# Patient Record
Sex: FEMALE | Race: WHITE | NOT HISPANIC OR LATINO | Employment: OTHER | URBAN - METROPOLITAN AREA
[De-identification: names, ages, dates, MRNs, and addresses within clinical notes are randomized per-mention and may not be internally consistent; named-entity substitution may affect disease eponyms.]

---

## 2017-02-03 ENCOUNTER — HOSPITAL ENCOUNTER (OUTPATIENT)
Dept: MAMMOGRAPHY | Facility: CLINIC | Age: 37
Discharge: HOME | End: 2017-02-03
Attending: OBSTETRICS & GYNECOLOGY | Admitting: OBSTETRICS & GYNECOLOGY
Payer: COMMERCIAL

## 2017-02-03 ENCOUNTER — HOSPITAL ENCOUNTER (OUTPATIENT)
Dept: ULTRASOUND IMAGING | Facility: CLINIC | Age: 37
End: 2017-02-03
Attending: OBSTETRICS & GYNECOLOGY
Payer: COMMERCIAL

## 2017-02-03 DIAGNOSIS — N63.10 LUMP OF BREAST, RIGHT: ICD-10-CM

## 2017-02-03 PROCEDURE — 77061 BREAST TOMOSYNTHESIS UNI: CPT

## 2017-02-03 PROCEDURE — 76642 ULTRASOUND BREAST LIMITED: CPT | Mod: RT

## 2018-02-02 ENCOUNTER — HEALTH MAINTENANCE LETTER (OUTPATIENT)
Age: 38
End: 2018-02-02

## 2019-05-10 ENCOUNTER — HOSPITAL ENCOUNTER (OUTPATIENT)
Dept: MAMMOGRAPHY | Facility: CLINIC | Age: 39
Discharge: HOME | End: 2019-05-10
Attending: OBSTETRICS & GYNECOLOGY | Admitting: OBSTETRICS & GYNECOLOGY
Payer: COMMERCIAL

## 2019-05-10 DIAGNOSIS — Z12.31 VISIT FOR SCREENING MAMMOGRAM: ICD-10-CM

## 2019-05-10 PROCEDURE — 77063 BREAST TOMOSYNTHESIS BI: CPT

## 2019-07-08 RX ORDER — ACETAMINOPHEN 500 MG
500-1000 TABLET ORAL EVERY 6 HOURS PRN
Status: ON HOLD | COMMUNITY
End: 2019-07-18

## 2019-07-08 RX ORDER — MULTIVIT-MIN/IRON/FOLIC ACID/K 18-600-40
2000 CAPSULE ORAL DAILY
COMMUNITY

## 2019-07-08 RX ORDER — IBUPROFEN 600 MG/1
600 TABLET, FILM COATED ORAL EVERY 6 HOURS PRN
Status: ON HOLD | COMMUNITY
End: 2019-07-19 | Stop reason: DRUGHIGH

## 2019-07-08 ASSESSMENT — MIFFLIN-ST. JEOR: SCORE: 1517.96

## 2019-07-15 NOTE — PHARMACY-ADMISSION MEDICATION HISTORY
Pharmacy reviewed prior to admission med list from pre-admitting rn, GAGAN Marie.        Prior to Admission medications    Medication Sig Last Dose Taking? Auth Provider   acetaminophen (TYLENOL) 500 MG tablet Take 500-1,000 mg by mouth every 6 hours as needed for mild pain 7/11/2019 at prn Yes Reported, Patient   Ascorbic Acid (VITAMIN C GUMMIE PO) Take 1 tablet by mouth daily  7/11/2019 Yes Reported, Patient   ELDERBERRY PO Take 1 tablet by mouth daily 7/11/2019 Yes Unknown, Entered By History   ibuprofen (ADVIL/MOTRIN) 600 MG tablet Take 600 mg by mouth every 6 hours as needed for moderate pain Past Month at Unknown time Yes Reported, Patient   norgestim-eth estrad triphasic (TRINESSA, 28,) 0.18/0.215/0.25 MG-35 MCG TABS tablet Take 1 tablet by mouth daily Will take until her surgery. Last dose planned is for 7/15 in the evening. 7/13/2019 at pm Yes Reported, Patient   Vitamin D, Cholecalciferol, 1000 units TABS Take 2,000 Units by mouth daily 7/11/2019 Yes Reported, Patient

## 2019-07-16 ENCOUNTER — ANESTHESIA EVENT (OUTPATIENT)
Dept: SURGERY | Facility: CLINIC | Age: 39
End: 2019-07-16
Payer: COMMERCIAL

## 2019-07-16 ENCOUNTER — ANESTHESIA (OUTPATIENT)
Dept: SURGERY | Facility: CLINIC | Age: 39
End: 2019-07-16
Payer: COMMERCIAL

## 2019-07-16 ENCOUNTER — HOSPITAL ENCOUNTER (INPATIENT)
Facility: CLINIC | Age: 39
LOS: 3 days | Discharge: HOME | End: 2019-07-19
Attending: OBSTETRICS & GYNECOLOGY | Admitting: OBSTETRICS & GYNECOLOGY
Payer: COMMERCIAL

## 2019-07-16 DIAGNOSIS — Z90.710 S/P TAH (TOTAL ABDOMINAL HYSTERECTOMY): Primary | ICD-10-CM

## 2019-07-16 LAB
ABO + RH BLD: NORMAL
ABO + RH BLD: NORMAL
BLD GP AB SCN SERPL QL: NORMAL
BLOOD BANK CMNT PATIENT-IMP: NORMAL
HCG UR QL: NEGATIVE
SPECIMEN EXP DATE BLD: NORMAL

## 2019-07-16 PROCEDURE — 27210794 ZZH OR GENERAL SUPPLY STERILE: Performed by: OBSTETRICS & GYNECOLOGY

## 2019-07-16 PROCEDURE — 25000128 H RX IP 250 OP 636: Performed by: ANESTHESIOLOGY

## 2019-07-16 PROCEDURE — 25000128 H RX IP 250 OP 636: Performed by: OBSTETRICS & GYNECOLOGY

## 2019-07-16 PROCEDURE — 25000125 ZZHC RX 250: Performed by: ANESTHESIOLOGY

## 2019-07-16 PROCEDURE — 12000000 ZZH R&B MED SURG/OB

## 2019-07-16 PROCEDURE — 86901 BLOOD TYPING SEROLOGIC RH(D): CPT | Performed by: OBSTETRICS & GYNECOLOGY

## 2019-07-16 PROCEDURE — 88307 TISSUE EXAM BY PATHOLOGIST: CPT | Performed by: OBSTETRICS & GYNECOLOGY

## 2019-07-16 PROCEDURE — 37000008 ZZH ANESTHESIA TECHNICAL FEE, 1ST 30 MIN: Performed by: OBSTETRICS & GYNECOLOGY

## 2019-07-16 PROCEDURE — 25000125 ZZHC RX 250: Performed by: OBSTETRICS & GYNECOLOGY

## 2019-07-16 PROCEDURE — 88342 IMHCHEM/IMCYTCHM 1ST ANTB: CPT | Performed by: OBSTETRICS & GYNECOLOGY

## 2019-07-16 PROCEDURE — 0UT90ZZ RESECTION OF UTERUS, OPEN APPROACH: ICD-10-PCS | Performed by: OBSTETRICS & GYNECOLOGY

## 2019-07-16 PROCEDURE — 71000013 ZZH RECOVERY PHASE 1 LEVEL 1 EA ADDTL HR: Performed by: OBSTETRICS & GYNECOLOGY

## 2019-07-16 PROCEDURE — 36415 COLL VENOUS BLD VENIPUNCTURE: CPT | Performed by: OBSTETRICS & GYNECOLOGY

## 2019-07-16 PROCEDURE — 25000132 ZZH RX MED GY IP 250 OP 250 PS 637: Performed by: OBSTETRICS & GYNECOLOGY

## 2019-07-16 PROCEDURE — 25800030 ZZH RX IP 258 OP 636: Performed by: OBSTETRICS & GYNECOLOGY

## 2019-07-16 PROCEDURE — 25000125 ZZHC RX 250: Performed by: NURSE ANESTHETIST, CERTIFIED REGISTERED

## 2019-07-16 PROCEDURE — 25800025 ZZH RX 258: Performed by: OBSTETRICS & GYNECOLOGY

## 2019-07-16 PROCEDURE — 86850 RBC ANTIBODY SCREEN: CPT | Performed by: OBSTETRICS & GYNECOLOGY

## 2019-07-16 PROCEDURE — 0UT70ZZ RESECTION OF BILATERAL FALLOPIAN TUBES, OPEN APPROACH: ICD-10-PCS | Performed by: OBSTETRICS & GYNECOLOGY

## 2019-07-16 PROCEDURE — 36000056 ZZH SURGERY LEVEL 3 1ST 30 MIN: Performed by: OBSTETRICS & GYNECOLOGY

## 2019-07-16 PROCEDURE — 86900 BLOOD TYPING SEROLOGIC ABO: CPT | Performed by: OBSTETRICS & GYNECOLOGY

## 2019-07-16 PROCEDURE — 81025 URINE PREGNANCY TEST: CPT | Performed by: ANESTHESIOLOGY

## 2019-07-16 PROCEDURE — 25000128 H RX IP 250 OP 636: Performed by: NURSE ANESTHETIST, CERTIFIED REGISTERED

## 2019-07-16 PROCEDURE — 88342 IMHCHEM/IMCYTCHM 1ST ANTB: CPT | Mod: 26 | Performed by: OBSTETRICS & GYNECOLOGY

## 2019-07-16 PROCEDURE — 37000009 ZZH ANESTHESIA TECHNICAL FEE, EACH ADDTL 15 MIN: Performed by: OBSTETRICS & GYNECOLOGY

## 2019-07-16 PROCEDURE — 88307 TISSUE EXAM BY PATHOLOGIST: CPT | Mod: 26 | Performed by: OBSTETRICS & GYNECOLOGY

## 2019-07-16 PROCEDURE — 40000306 ZZH STATISTIC PRE PROC ASSESS II: Performed by: OBSTETRICS & GYNECOLOGY

## 2019-07-16 PROCEDURE — 25800030 ZZH RX IP 258 OP 636: Performed by: ANESTHESIOLOGY

## 2019-07-16 PROCEDURE — 36000058 ZZH SURGERY LEVEL 3 EA 15 ADDTL MIN: Performed by: OBSTETRICS & GYNECOLOGY

## 2019-07-16 PROCEDURE — 71000012 ZZH RECOVERY PHASE 1 LEVEL 1 FIRST HR: Performed by: OBSTETRICS & GYNECOLOGY

## 2019-07-16 RX ORDER — LIDOCAINE HYDROCHLORIDE 10 MG/ML
INJECTION, SOLUTION INFILTRATION; PERINEURAL PRN
Status: DISCONTINUED | OUTPATIENT
Start: 2019-07-16 | End: 2019-07-16

## 2019-07-16 RX ORDER — FENTANYL CITRATE 50 UG/ML
25-50 INJECTION, SOLUTION INTRAMUSCULAR; INTRAVENOUS
Status: DISCONTINUED | OUTPATIENT
Start: 2019-07-16 | End: 2019-07-16 | Stop reason: HOSPADM

## 2019-07-16 RX ORDER — ONDANSETRON 4 MG/1
4 TABLET, ORALLY DISINTEGRATING ORAL EVERY 6 HOURS PRN
Status: DISCONTINUED | OUTPATIENT
Start: 2019-07-16 | End: 2019-07-19 | Stop reason: HOSPADM

## 2019-07-16 RX ORDER — DEXAMETHASONE SODIUM PHOSPHATE 4 MG/ML
INJECTION, SOLUTION INTRA-ARTICULAR; INTRALESIONAL; INTRAMUSCULAR; INTRAVENOUS; SOFT TISSUE PRN
Status: DISCONTINUED | OUTPATIENT
Start: 2019-07-16 | End: 2019-07-16

## 2019-07-16 RX ORDER — LIDOCAINE 40 MG/G
CREAM TOPICAL
Status: DISCONTINUED | OUTPATIENT
Start: 2019-07-16 | End: 2019-07-19 | Stop reason: HOSPADM

## 2019-07-16 RX ORDER — MAGNESIUM HYDROXIDE 1200 MG/15ML
LIQUID ORAL PRN
Status: DISCONTINUED | OUTPATIENT
Start: 2019-07-16 | End: 2019-07-16 | Stop reason: HOSPADM

## 2019-07-16 RX ORDER — ONDANSETRON 2 MG/ML
4 INJECTION INTRAMUSCULAR; INTRAVENOUS EVERY 6 HOURS PRN
Status: DISCONTINUED | OUTPATIENT
Start: 2019-07-16 | End: 2019-07-19 | Stop reason: HOSPADM

## 2019-07-16 RX ORDER — SODIUM CHLORIDE, SODIUM LACTATE, POTASSIUM CHLORIDE, CALCIUM CHLORIDE 600; 310; 30; 20 MG/100ML; MG/100ML; MG/100ML; MG/100ML
INJECTION, SOLUTION INTRAVENOUS CONTINUOUS
Status: DISCONTINUED | OUTPATIENT
Start: 2019-07-16 | End: 2019-07-16 | Stop reason: HOSPADM

## 2019-07-16 RX ORDER — OXYCODONE HYDROCHLORIDE 5 MG/1
5-10 TABLET ORAL
Status: DISCONTINUED | OUTPATIENT
Start: 2019-07-16 | End: 2019-07-19 | Stop reason: HOSPADM

## 2019-07-16 RX ORDER — EPHEDRINE SULFATE 50 MG/ML
INJECTION, SOLUTION INTRAMUSCULAR; INTRAVENOUS; SUBCUTANEOUS PRN
Status: DISCONTINUED | OUTPATIENT
Start: 2019-07-16 | End: 2019-07-16

## 2019-07-16 RX ORDER — NALOXONE HYDROCHLORIDE 0.4 MG/ML
.1-.4 INJECTION, SOLUTION INTRAMUSCULAR; INTRAVENOUS; SUBCUTANEOUS
Status: DISCONTINUED | OUTPATIENT
Start: 2019-07-16 | End: 2019-07-19 | Stop reason: HOSPADM

## 2019-07-16 RX ORDER — NALOXONE HYDROCHLORIDE 0.4 MG/ML
.1-.4 INJECTION, SOLUTION INTRAMUSCULAR; INTRAVENOUS; SUBCUTANEOUS
Status: ACTIVE | OUTPATIENT
Start: 2019-07-16 | End: 2019-07-17

## 2019-07-16 RX ORDER — METOCLOPRAMIDE 5 MG/1
10 TABLET ORAL EVERY 6 HOURS PRN
Status: DISCONTINUED | OUTPATIENT
Start: 2019-07-16 | End: 2019-07-19 | Stop reason: HOSPADM

## 2019-07-16 RX ORDER — PROCHLORPERAZINE MALEATE 10 MG
10 TABLET ORAL EVERY 6 HOURS PRN
Status: DISCONTINUED | OUTPATIENT
Start: 2019-07-16 | End: 2019-07-19 | Stop reason: HOSPADM

## 2019-07-16 RX ORDER — AMOXICILLIN 250 MG
1 CAPSULE ORAL 2 TIMES DAILY
Status: DISCONTINUED | OUTPATIENT
Start: 2019-07-16 | End: 2019-07-19 | Stop reason: HOSPADM

## 2019-07-16 RX ORDER — LIDOCAINE 40 MG/G
CREAM TOPICAL
Status: DISCONTINUED | OUTPATIENT
Start: 2019-07-16 | End: 2019-07-16 | Stop reason: HOSPADM

## 2019-07-16 RX ORDER — FENTANYL CITRATE 50 UG/ML
INJECTION, SOLUTION INTRAMUSCULAR; INTRAVENOUS PRN
Status: DISCONTINUED | OUTPATIENT
Start: 2019-07-16 | End: 2019-07-16

## 2019-07-16 RX ORDER — ACETAMINOPHEN 325 MG/1
975 TABLET ORAL EVERY 8 HOURS
Status: COMPLETED | OUTPATIENT
Start: 2019-07-16 | End: 2019-07-19

## 2019-07-16 RX ORDER — HYDROMORPHONE HYDROCHLORIDE 1 MG/ML
.3-.5 INJECTION, SOLUTION INTRAMUSCULAR; INTRAVENOUS; SUBCUTANEOUS EVERY 5 MIN PRN
Status: DISCONTINUED | OUTPATIENT
Start: 2019-07-16 | End: 2019-07-16 | Stop reason: HOSPADM

## 2019-07-16 RX ORDER — ONDANSETRON 2 MG/ML
4 INJECTION INTRAMUSCULAR; INTRAVENOUS EVERY 30 MIN PRN
Status: DISCONTINUED | OUTPATIENT
Start: 2019-07-16 | End: 2019-07-16 | Stop reason: HOSPADM

## 2019-07-16 RX ORDER — METOCLOPRAMIDE HYDROCHLORIDE 5 MG/ML
10 INJECTION INTRAMUSCULAR; INTRAVENOUS EVERY 6 HOURS PRN
Status: DISCONTINUED | OUTPATIENT
Start: 2019-07-16 | End: 2019-07-19 | Stop reason: HOSPADM

## 2019-07-16 RX ORDER — KETOROLAC TROMETHAMINE 30 MG/ML
INJECTION, SOLUTION INTRAMUSCULAR; INTRAVENOUS PRN
Status: DISCONTINUED | OUTPATIENT
Start: 2019-07-16 | End: 2019-07-16

## 2019-07-16 RX ORDER — CEFAZOLIN SODIUM 1 G/3ML
1 INJECTION, POWDER, FOR SOLUTION INTRAMUSCULAR; INTRAVENOUS SEE ADMIN INSTRUCTIONS
Status: DISCONTINUED | OUTPATIENT
Start: 2019-07-16 | End: 2019-07-16 | Stop reason: HOSPADM

## 2019-07-16 RX ORDER — KETOROLAC TROMETHAMINE 30 MG/ML
30 INJECTION, SOLUTION INTRAMUSCULAR; INTRAVENOUS EVERY 6 HOURS PRN
Status: DISCONTINUED | OUTPATIENT
Start: 2019-07-16 | End: 2019-07-17

## 2019-07-16 RX ORDER — ACETAMINOPHEN 325 MG/1
650 TABLET ORAL EVERY 4 HOURS PRN
Status: DISCONTINUED | OUTPATIENT
Start: 2019-07-19 | End: 2019-07-19 | Stop reason: HOSPADM

## 2019-07-16 RX ORDER — EPHEDRINE SULFATE 50 MG/ML
25 INJECTION, SOLUTION INTRAVENOUS ONCE
Status: COMPLETED | OUTPATIENT
Start: 2019-07-16 | End: 2019-07-16

## 2019-07-16 RX ORDER — PROPOFOL 10 MG/ML
INJECTION, EMULSION INTRAVENOUS PRN
Status: DISCONTINUED | OUTPATIENT
Start: 2019-07-16 | End: 2019-07-16

## 2019-07-16 RX ORDER — AMOXICILLIN 250 MG
2 CAPSULE ORAL 2 TIMES DAILY
Status: DISCONTINUED | OUTPATIENT
Start: 2019-07-16 | End: 2019-07-19 | Stop reason: HOSPADM

## 2019-07-16 RX ORDER — PROPOFOL 10 MG/ML
INJECTION, EMULSION INTRAVENOUS CONTINUOUS PRN
Status: DISCONTINUED | OUTPATIENT
Start: 2019-07-16 | End: 2019-07-16

## 2019-07-16 RX ORDER — ONDANSETRON 4 MG/1
4 TABLET, ORALLY DISINTEGRATING ORAL EVERY 30 MIN PRN
Status: DISCONTINUED | OUTPATIENT
Start: 2019-07-16 | End: 2019-07-16 | Stop reason: HOSPADM

## 2019-07-16 RX ORDER — ONDANSETRON 2 MG/ML
INJECTION INTRAMUSCULAR; INTRAVENOUS PRN
Status: DISCONTINUED | OUTPATIENT
Start: 2019-07-16 | End: 2019-07-16

## 2019-07-16 RX ORDER — NEOSTIGMINE METHYLSULFATE 1 MG/ML
VIAL (ML) INJECTION PRN
Status: DISCONTINUED | OUTPATIENT
Start: 2019-07-16 | End: 2019-07-16

## 2019-07-16 RX ORDER — CEFAZOLIN SODIUM 2 G/100ML
2 INJECTION, SOLUTION INTRAVENOUS
Status: COMPLETED | OUTPATIENT
Start: 2019-07-16 | End: 2019-07-16

## 2019-07-16 RX ORDER — GLYCOPYRROLATE 0.2 MG/ML
INJECTION, SOLUTION INTRAMUSCULAR; INTRAVENOUS PRN
Status: DISCONTINUED | OUTPATIENT
Start: 2019-07-16 | End: 2019-07-16

## 2019-07-16 RX ORDER — PROMETHAZINE HYDROCHLORIDE 25 MG/ML
25 INJECTION INTRAMUSCULAR; INTRAVENOUS ONCE
Status: COMPLETED | OUTPATIENT
Start: 2019-07-16 | End: 2019-07-16

## 2019-07-16 RX ADMIN — CEFAZOLIN 1 G: 1 INJECTION, POWDER, FOR SOLUTION INTRAMUSCULAR; INTRAVENOUS at 11:40

## 2019-07-16 RX ADMIN — Medication 2 MG: at 13:00

## 2019-07-16 RX ADMIN — Medication 5 MG: at 10:26

## 2019-07-16 RX ADMIN — ACETAMINOPHEN 975 MG: 325 TABLET, FILM COATED ORAL at 23:49

## 2019-07-16 RX ADMIN — Medication: at 13:41

## 2019-07-16 RX ADMIN — SODIUM CHLORIDE, POTASSIUM CHLORIDE, SODIUM LACTATE AND CALCIUM CHLORIDE: 600; 310; 30; 20 INJECTION, SOLUTION INTRAVENOUS at 13:10

## 2019-07-16 RX ADMIN — GLYCOPYRROLATE 0.4 MG: 0.2 INJECTION, SOLUTION INTRAMUSCULAR; INTRAVENOUS at 13:00

## 2019-07-16 RX ADMIN — Medication 5 MG: at 10:52

## 2019-07-16 RX ADMIN — ROCURONIUM BROMIDE 20 MG: 10 INJECTION INTRAVENOUS at 10:59

## 2019-07-16 RX ADMIN — ROCURONIUM BROMIDE 10 MG: 10 INJECTION INTRAVENOUS at 10:10

## 2019-07-16 RX ADMIN — ONDANSETRON 4 MG: 2 INJECTION INTRAMUSCULAR; INTRAVENOUS at 13:40

## 2019-07-16 RX ADMIN — EPHEDRINE SULFATE 25 MG: 50 INJECTION, SOLUTION INTRAVENOUS at 14:46

## 2019-07-16 RX ADMIN — DEXTROSE AND SODIUM CHLORIDE: 5; 450 INJECTION, SOLUTION INTRAVENOUS at 16:52

## 2019-07-16 RX ADMIN — ROCURONIUM BROMIDE 20 MG: 10 INJECTION INTRAVENOUS at 11:27

## 2019-07-16 RX ADMIN — MIDAZOLAM 2 MG: 1 INJECTION INTRAMUSCULAR; INTRAVENOUS at 09:38

## 2019-07-16 RX ADMIN — PROMETHAZINE HYDROCHLORIDE 25 MG: 25 INJECTION INTRAMUSCULAR; INTRAVENOUS at 14:45

## 2019-07-16 RX ADMIN — SODIUM CHLORIDE, POTASSIUM CHLORIDE, SODIUM LACTATE AND CALCIUM CHLORIDE: 600; 310; 30; 20 INJECTION, SOLUTION INTRAVENOUS at 08:40

## 2019-07-16 RX ADMIN — HYDROMORPHONE HYDROCHLORIDE 1 MG: 1 INJECTION, SOLUTION INTRAMUSCULAR; INTRAVENOUS; SUBCUTANEOUS at 10:11

## 2019-07-16 RX ADMIN — PROPOFOL 200 MG: 10 INJECTION, EMULSION INTRAVENOUS at 09:45

## 2019-07-16 RX ADMIN — GLYCOPYRROLATE 0.2 MG: 0.2 INJECTION, SOLUTION INTRAMUSCULAR; INTRAVENOUS at 09:45

## 2019-07-16 RX ADMIN — KETOROLAC TROMETHAMINE 30 MG: 30 INJECTION, SOLUTION INTRAMUSCULAR at 13:02

## 2019-07-16 RX ADMIN — ACETAMINOPHEN 975 MG: 325 TABLET, FILM COATED ORAL at 16:51

## 2019-07-16 RX ADMIN — HYDROMORPHONE HYDROCHLORIDE 0.5 MG: 1 INJECTION, SOLUTION INTRAMUSCULAR; INTRAVENOUS; SUBCUTANEOUS at 11:43

## 2019-07-16 RX ADMIN — LIDOCAINE HYDROCHLORIDE 30 MG: 10 INJECTION, SOLUTION INFILTRATION; PERINEURAL at 09:45

## 2019-07-16 RX ADMIN — SODIUM CHLORIDE, POTASSIUM CHLORIDE, SODIUM LACTATE AND CALCIUM CHLORIDE: 600; 310; 30; 20 INJECTION, SOLUTION INTRAVENOUS at 10:42

## 2019-07-16 RX ADMIN — DEXTROSE AND SODIUM CHLORIDE: 5; 450 INJECTION, SOLUTION INTRAVENOUS at 23:57

## 2019-07-16 RX ADMIN — FENTANYL CITRATE 100 MCG: 50 INJECTION, SOLUTION INTRAMUSCULAR; INTRAVENOUS at 09:45

## 2019-07-16 RX ADMIN — ONDANSETRON 4 MG: 2 INJECTION INTRAMUSCULAR; INTRAVENOUS at 12:40

## 2019-07-16 RX ADMIN — CEFAZOLIN SODIUM 2 G: 2 INJECTION, SOLUTION INTRAVENOUS at 09:40

## 2019-07-16 RX ADMIN — SENNOSIDES AND DOCUSATE SODIUM 1 TABLET: 8.6; 5 TABLET ORAL at 21:11

## 2019-07-16 RX ADMIN — DEXAMETHASONE SODIUM PHOSPHATE 4 MG: 4 INJECTION, SOLUTION INTRA-ARTICULAR; INTRALESIONAL; INTRAMUSCULAR; INTRAVENOUS; SOFT TISSUE at 09:45

## 2019-07-16 RX ADMIN — ROCURONIUM BROMIDE 10 MG: 10 INJECTION INTRAVENOUS at 12:28

## 2019-07-16 RX ADMIN — HYDROMORPHONE HYDROCHLORIDE 0.5 MG: 1 INJECTION, SOLUTION INTRAMUSCULAR; INTRAVENOUS; SUBCUTANEOUS at 10:58

## 2019-07-16 RX ADMIN — FENTANYL CITRATE 50 MCG: 50 INJECTION, SOLUTION INTRAMUSCULAR; INTRAVENOUS at 14:32

## 2019-07-16 RX ADMIN — PROPOFOL 75 MCG/KG/MIN: 10 INJECTION, EMULSION INTRAVENOUS at 09:52

## 2019-07-16 RX ADMIN — ROCURONIUM BROMIDE 40 MG: 10 INJECTION INTRAVENOUS at 09:45

## 2019-07-16 RX ADMIN — PROPOFOL 50 MG: 10 INJECTION, EMULSION INTRAVENOUS at 10:58

## 2019-07-16 RX ADMIN — FENTANYL CITRATE 50 MCG: 50 INJECTION, SOLUTION INTRAMUSCULAR; INTRAVENOUS at 13:38

## 2019-07-16 ASSESSMENT — ACTIVITIES OF DAILY LIVING (ADL)
ADLS_ACUITY_SCORE: 11
ADLS_ACUITY_SCORE: 13

## 2019-07-16 ASSESSMENT — MIFFLIN-ST. JEOR: SCORE: 1505.5

## 2019-07-16 NOTE — ANESTHESIA PREPROCEDURE EVALUATION
Anesthesia Pre-Procedure Evaluation    Patient: Linn Lyman   MRN: [id number] : 1980          Preoperative Diagnosis: uterine fibroids    Procedure(s):  total abdominal hysterectomy, bilateral salpingectomy    Past Medical History:   Diagnosis Date     Irregular heart beat 2019    palpitations, workup negative     Past Surgical History:   Procedure Laterality Date     CYSTECTOMY PILONIDAL       HEAD & NECK SURGERY      wisdom teeth removed     Anesthesia Evaluation     .             ROS/MED HX    ENT/Pulmonary:      (-) asthma and sleep apnea   Neurologic:      (-) CVA, Other neuro hx and Dementia   Cardiovascular:     (+) Dyslipidemia, ----. : . . . :. Irregular Heartbeat/Palpitations, .      (-) hypertension, CHF and pulmonary hypertension   METS/Exercise Tolerance:     Hematologic:     (+) Anemia, -      Musculoskeletal:        (-) arthritis   GI/Hepatic:        (-) GERD and hepatitis   Renal/Genitourinary:      (-) renal disease   Endo:     (+) Obesity, .   (-) Type I DM, Type II DM, thyroid disease and chronic steroid usage   Psychiatric:     (+) psychiatric history depression      Infectious Disease:  - neg infectious disease ROS       Malignancy:      - no malignancy   Other:    - neg other ROS                      Physical Exam      Airway   Mallampati: II  TM distance: >3 FB  Neck ROM: full    Dental     Cardiovascular   Rhythm and rate: regular and normal  (-) no murmur    Pulmonary    breath sounds clear to auscultation    Other findings: Lab Test        02/25/15                       0959          WBC          6.2           HGB          13.3          MCV          95            PLT          227            Lab Test        02/25/15                       0959          NA           138           POTASSIUM    4.4           CHLORIDE     107           CO2          22            BUN          11            CR           0.67          ANIONGAP     9             BETH          9.3           GLC          " 88                  Lab Results   Component Value Date    WBC 6.2 02/25/2015    HGB 13.3 02/25/2015    HCT 40.8 02/25/2015     02/25/2015     02/25/2015    POTASSIUM 4.4 02/25/2015    CHLORIDE 107 02/25/2015    CO2 22 02/25/2015    BUN 11 02/25/2015    CR 0.67 02/25/2015    GLC 88 02/25/2015    BETH 9.3 02/25/2015    ALBUMIN 3.4 02/25/2015    PROTTOTAL 7.5 02/25/2015    ALT 23 02/25/2015    AST 18 02/25/2015    ALKPHOS 73 02/25/2015    BILITOTAL 0.2 02/25/2015    LIPASE 152 02/25/2015    TSH 2.35 02/25/2015    HCG Negative 07/16/2019       Preop Vitals  BP Readings from Last 3 Encounters:   07/16/19 (!) 138/92   08/19/16 144/87   06/20/16 130/85    Pulse Readings from Last 3 Encounters:   07/16/19 65   08/19/16 84   06/20/16 85      Resp Readings from Last 3 Encounters:   07/16/19 20   01/19/15 18    SpO2 Readings from Last 3 Encounters:   07/16/19 96%   08/19/16 98%   03/19/15 99%      Temp Readings from Last 1 Encounters:   07/16/19 97.7  F (36.5  C) (Temporal)    Ht Readings from Last 1 Encounters:   07/16/19 1.651 m (5' 5\")      Wt Readings from Last 1 Encounters:   07/16/19 83.5 kg (184 lb)    Estimated body mass index is 30.62 kg/m  as calculated from the following:    Height as of this encounter: 1.651 m (5' 5\").    Weight as of this encounter: 83.5 kg (184 lb).       Anesthesia Plan      History & Physical Review  History and physical reviewed and following examination; no interval change.    ASA Status:  2 .    NPO Status:  > 8 hours    Plan for General and ETT with Propofol induction. Maintenance will be Balanced.    PONV prophylaxis:  Ondansetron (or other 5HT-3) and Dexamethasone or Solumedrol  Propofol gtt + Sevo + 60% O2 please      Postoperative Care  Postoperative pain management:  IV analgesics and Oral pain medications.      Consents  Anesthetic plan, risks, benefits and alternatives discussed with:  Patient.  Use of blood products discussed: Yes.   Use of blood products discussed with " Patient.  Consented to blood products.  .                 Pierce Durham MD                    .

## 2019-07-16 NOTE — PLAN OF CARE
Patient admitted from PACU at approximately 1545 following total abdominal hysterectomy and bilateral salpingectomy. Pain controlled with PCA. Alert/oriented x4, but sleepy. Denies nausea/vomiting. Tolerating water and ice chips. IVF infusing. Incision covered with dressing, no drainage. Ice applied. Bright catheter patent with good urine output. Bowel sounds absence and denies flatus. Skin intact with exception of incision. Mom, Luma was at bedside but has gone home, phone number on white board. Lung sounds clear throughout. Capnography on with no alarming and WDL. Vital signs stable, afebrile. PCD's on for DVT prevention. Plan to remove bright on POD.

## 2019-07-16 NOTE — ANESTHESIA POSTPROCEDURE EVALUATION
Patient: Linn Lyman    Procedure(s):  total abdominal hysterectomy, bilateral salpingectomy    Diagnosis:uterine fibroids  Diagnosis Additional Information: No value filed.    Anesthesia Type:  General, ETT    Note:  Anesthesia Post Evaluation    Patient location during evaluation: PACU  Patient participation: Able to fully participate in evaluation  Level of consciousness: awake  Pain management: adequate  Airway patency: patent  Cardiovascular status: acceptable  Respiratory status: acceptable  Hydration status: euvolemic  PONV: controlled     Anesthetic complications: None          Last vitals:  Vitals:    07/16/19 1405 07/16/19 1410 07/16/19 1420   BP: 119/61 120/52 119/61   Pulse: 70 104 70   Resp: 10 21 (!) 44   Temp:   97.7  F (36.5  C)   SpO2: 96% 97% 98%         Electronically Signed By: Pierce Durham MD  July 16, 2019  3:24 PM

## 2019-07-16 NOTE — BRIEF OP NOTE
[facility] Brief Operative Note    Pre-operative diagnosis: uterine fibroids   Post-operative diagnosis  same   Procedure: Procedure(s):  total abdominal hysterectomy, bilateral salpingectomy   Surgeon(s): Surgeon(s) and Role:     * Rosalind Park MD - Primary     * Susana Rogers MD - Assisting   Estimated blood loss: 650 mL    Specimens: ID Type Source Tests Collected by Time Destination   A : uterus, cervix, bilateral fallopian tubes Tissue Uterus, Cervix and Bilateral Fallopian Tubes SURGICAL PATHOLOGY EXAM Rosalind Park MD 7/16/2019 12:40 PM       Findings: 20 week size uterus with mulitple fibroids.  Normal fallopian tubes and ovaries.  Normal appendix.

## 2019-07-16 NOTE — ANESTHESIA CARE TRANSFER NOTE
Patient: Linn Lyman    Procedure(s):  total abdominal hysterectomy, bilateral salpingectomy    Diagnosis: uterine fibroids  Diagnosis Additional Information: No value filed.    Anesthesia Type:   General, ETT     Note:  Airway :Face Mask  Patient transferred to:PACU  Comments: Patient oral suctioned. Patient with spontaneous respirations and adequate tidal volumes. Patient awake and responsive. Extubated in OR to 8 L face tent. To PACU ventilating well. VSS. Report given.Handoff Report: Identifed the Patient, Identified the Reponsible Provider, Reviewed the pertinent medical history, Discussed the surgical course, Reviewed Intra-OP anesthesia mangement and issues during anesthesia, Set expectations for post-procedure period and Allowed opportunity for questions and acknowledgement of understanding      Vitals: (Last set prior to Anesthesia Care Transfer)    CRNA VITALS  7/16/2019 1251 - 7/16/2019 1328      7/16/2019             NIBP:  149/81    NIBP Mean:  96                Electronically Signed By: FRANK Avendano CRNA  July 16, 2019  1:28 PM

## 2019-07-17 LAB
GLUCOSE SERPL-MCNC: 121 MG/DL (ref 70–99)
HGB BLD-MCNC: 10.2 G/DL (ref 11.7–15.7)

## 2019-07-17 PROCEDURE — 82947 ASSAY GLUCOSE BLOOD QUANT: CPT | Performed by: OBSTETRICS & GYNECOLOGY

## 2019-07-17 PROCEDURE — 25000132 ZZH RX MED GY IP 250 OP 250 PS 637: Performed by: OBSTETRICS & GYNECOLOGY

## 2019-07-17 PROCEDURE — 25800025 ZZH RX 258: Performed by: OBSTETRICS & GYNECOLOGY

## 2019-07-17 PROCEDURE — 36415 COLL VENOUS BLD VENIPUNCTURE: CPT | Performed by: OBSTETRICS & GYNECOLOGY

## 2019-07-17 PROCEDURE — 25000128 H RX IP 250 OP 636: Performed by: OBSTETRICS & GYNECOLOGY

## 2019-07-17 PROCEDURE — 12000000 ZZH R&B MED SURG/OB

## 2019-07-17 PROCEDURE — 85018 HEMOGLOBIN: CPT | Performed by: OBSTETRICS & GYNECOLOGY

## 2019-07-17 PROCEDURE — 40000556 ZZH STATISTIC PERIPHERAL IV START W US GUIDANCE

## 2019-07-17 RX ORDER — IBUPROFEN 800 MG/1
800 TABLET, FILM COATED ORAL EVERY 6 HOURS PRN
Status: DISCONTINUED | OUTPATIENT
Start: 2019-07-17 | End: 2019-07-19 | Stop reason: HOSPADM

## 2019-07-17 RX ADMIN — ACETAMINOPHEN 975 MG: 325 TABLET, FILM COATED ORAL at 16:32

## 2019-07-17 RX ADMIN — ACETAMINOPHEN 975 MG: 325 TABLET, FILM COATED ORAL at 08:10

## 2019-07-17 RX ADMIN — IBUPROFEN 800 MG: 800 TABLET ORAL at 16:32

## 2019-07-17 RX ADMIN — SENNOSIDES AND DOCUSATE SODIUM 1 TABLET: 8.6; 5 TABLET ORAL at 08:10

## 2019-07-17 RX ADMIN — SENNOSIDES AND DOCUSATE SODIUM 2 TABLET: 8.6; 5 TABLET ORAL at 20:00

## 2019-07-17 RX ADMIN — DEXTROSE AND SODIUM CHLORIDE: 5; 450 INJECTION, SOLUTION INTRAVENOUS at 08:09

## 2019-07-17 RX ADMIN — KETOROLAC TROMETHAMINE 30 MG: 30 INJECTION, SOLUTION INTRAMUSCULAR at 04:23

## 2019-07-17 RX ADMIN — DEXTROSE AND SODIUM CHLORIDE: 5; 450 INJECTION, SOLUTION INTRAVENOUS at 18:26

## 2019-07-17 RX ADMIN — ONDANSETRON HYDROCHLORIDE 4 MG: 2 INJECTION, SOLUTION INTRAMUSCULAR; INTRAVENOUS at 09:55

## 2019-07-17 RX ADMIN — OXYCODONE HYDROCHLORIDE 5 MG: 5 TABLET ORAL at 20:00

## 2019-07-17 RX ADMIN — OXYCODONE HYDROCHLORIDE 10 MG: 5 TABLET ORAL at 22:55

## 2019-07-17 ASSESSMENT — ACTIVITIES OF DAILY LIVING (ADL)
ADLS_ACUITY_SCORE: 13

## 2019-07-17 ASSESSMENT — MIFFLIN-ST. JEOR: SCORE: 1495.97

## 2019-07-17 NOTE — OP NOTE
Procedure Date: 07/16/2019      PREOPERATIVE DIAGNOSIS:     1.  Large fibroid uterus.   2.  Pelvic pain.      POSTOPERATIVE DIAGNOSIS:     1.  Large fibroid uterus.   2.  Pelvic pain.      PROCEDURE:  Total abdominal hysterectomy, bilateral salpingectomy, left ureterolysis, lysis of adhesions.      SURGEON:  Rosalind Park MD      ASSISTANT:  Susana Rogers MD      ANESTHESIA:  General.      ESTIMATED BLOOD LOSS:  650 mL.      COMPLICATIONS:  None.      SPECIMENS:  Uterus, cervix, fallopian tubes bilaterally, and fibroid sent to Pathology together.      OPERATIVE FINDINGS:  On exam under anesthesia, the uterus is palpable to just below the umbilicus.  At laparotomy, the appendix appears normal.  The fallopian tubes and ovaries bilaterally appeared normal.  The uterus is approximately 20-week size with multiple large fibroids.  There is a 5 to 6 cm posterior subserosal fibroid as well as a 5 to 6 cm left fibroid in the broad ligament, which is to left and below the cervix.      INDICATIONS:  This patient is a 38-year-old nulligravida female with known uterine fibroids.  Ultrasound had shown up to 8 fibroids, the largest being 7 to 8 cm in size.  The patient has not wanted to pursue hysterectomy in the past due to wanting to remain open to the possibility of childbearing.  At this point, however, she is ready to proceed with definitive management for her large fibroid uterus as she does not plan to have any children.  She reports increasing symptoms from her fibroids including pelvic pain, pelvic pressure, urinary frequency and urgency.  She has been on birth control pills and does have a normal withdrawal bleed on this.  The patient's most recent ultrasound in the last couple of months showed the uterus to measure 15.7 x 12.5 x 7.5 cm.  The uterus was diffusely fibrotic with multiple fibroids seen.  The largest measurable fibroid on this ultrasound measured 7 x 6 x 5.6 cm.  Endometrial canal was unable to be  visualized due to shadowing from the multiple fibroids.  Ovaries did both appeared normal.  The options were discussed with the patient in detail and again she does wish to proceed with definitive treatment in the form of hysterectomy due to her significant symptoms from her fibroids.  She was seen by my partner, Dr. Rowley, for consult regarding possibility of a robotic hysterectomy but given the size and location of her fibroids, this was not deemed a possibility and a total abdominal hysterectomy was recommended.  We discussed the risks and benefits of total abdominal hysterectomy and bilateral salpingectomy, including risks of bleeding, possible need for blood transfusion, infection, and damage to adjacent organs including the bowel, bladder and ureters.  We also discussed what to expect postoperatively entering the recovery period as well as restrictions that would be in place as she recovers.  We discussed ovaries and plan to leave these in place if they did appear normal at the time of surgery.  After all of this discussion surgical consent was signed.      PROCEDURE:  After consent was signed, the patient was taken to the operating room where she was placed under anesthesia without difficulty.  She was placed on the operating table in the dorsal supine position.  She was examined under anesthesia for the above-noted findings.  Decision was made to proceed with a Pfannenstiel skin incision.  A Brar catheter was placed in her bladder and she was prepped and draped in the usual sterile fashion.  A timeout was performed.  A Pfannenstiel incision was made with a scalpel through the skin and carried down to fascia with cautery.  The fascia was incised in the midline.  The fascial incision was extended laterally in both directions with Vazquez scissors.  The rectus muscles were dissected off the fascia and  in the midline.  Peritoneum was identified and entered with Metzenbaum scissors.  This incision was  extended superiorly and inferiorly with good visualization of the bladder placed.  A Ori retractor was placed.  Bladder blade was placed.  The bowel was swept out of the pelvis and carefully packed away.  There were noted to be adhesions of the bowel to the left sidewall.  A small amount of these adhesions were taken down with Metzenbaum scissors.  The remainder was not necessary to be taken down in order to safely complete the surgery and therefore left in place.  At this point, the uterus was delivered through the incision.  The uterus was twisted to the patient's left, so that the right adnexa was posterior.  The uterus was enlarged with multiple fibroids throughout.  Specifically, a large subserosal fibroid was noted posteriorly as well as a large fibroid in the left broad ligament.  Initially, the right round ligament was grasped with Kocher, divided, and suture ligated with 0 Vicryl.  The anterior leaf of the broad ligament was opened with Metzenbaum scissors.  The posterior leaf of the broad ligament was opened slightly.  There was significant difficulty in visualization and therefore, attention was turned to the left round ligament.  This again was clamped, divided, and suture ligated with 0 Vicryl.  Again, the anterior leaf of the broad ligament was opened to the midline.  The left fallopian tube was then removed by clamping the mesosalpinx just below the fallopian tube, dividing the tissue, and securing the pedicle with 0 Vicryl suture.  This was done in 3 pedicles.  At this point, the cornua, the fallopian tube was clamped and excised.  The pedicle was secured with a stick tie of 0 Vicryl.  A defect was then created under the utero-ovarian ligament and this was doubly clamped with Ezequiel clamps, transected, and the pedicle secured with stick ties of 0 Vicryl.  This allowed the ovary to fall to the pelvic sidewall.  The bladder was then dissected off with a stick sponge further off the lower uterine  segment.  The large left broad ligament fibroid was encountered at this time and significant dissection was required to remove bladder from this area.  Attention was then turned again to the right.  In a similar fashion, the right fallopian tube was excised, creating pedicles under the fallopian tube across the mesosalpinx clamping then transecting the tissue and securing the pedicles closed with 0 Vicryl.  The right fallopian tube was then clamped at the cornua, transected, and the pedicle secured with 0 Vicryl stick tie.  The right ovary with the twist to the uterus was posterior.  A significant dissection was required to create a defect under the right utero-ovarian ligament.  This was done doubly clamped with Ezequiel clamps, transected, and secured with stick ties of sutures of 0 Vicryl.  Continuing on the right, the uterine vessels were skeletonized, doubly clamped with Ezequiel clamps, transected, and secured with stick ties of 0 Vicryl.  The bladder was again inspected and additional dissection was required to remove it to the level of the cervix.  At this point, there was some question about the location of the bladder.  The bladder was therefore filled with 180 mL of normal saline with methylene blue.  This allowed the plane between the bladder and the vagina and uterus to be well seen.  It was found at this time that the bladder was intact, had been dissected below the level of the cervix.  At this point, the broad ligament on the right was clamped with a straight Ezequiel clamp hugging the cervix, transected, and suture ligated with 0 Vicryl.  The ureter on the right was able to be seen below the level of dissection.  On the right,  the dissection had occurred down to the level of the cervix.  At this point, attention was then turned again to the left side.  On the left,  the uterine vessels were also doubly clamped with Ezequiel clamps, transected, and suture ligated with 0 Vicryl.  The fibroid in the left  broad ligament was then addressed.  At this point, it became clear myomectomy needed to be performed in order to be able to remove the cervix.  The main portion of the uterus with multiple fibroids was transected off of the cervix with cautery and removed from the field.  The approximately 5 to 6 cm fibroid in the left broad ligament just adjacent and to the left of the cervix was able to now be well visualized.  The left ureter was found and was draped just lateral to this fibroid.  Left ureterolysis was therefore necessary in order to be remove the ureter away from the fibroid allowing that fibroid to be removed.  With the ureter had been isolated, the fibroid in the broad ligament was able to be shelled out and dissected away from the sidewall and the cervix to the left and able to be removed.  At this point, the cervix was remaining.  The bladder was found to be dissected well below.  A scalpel was used to incise the vagina just below the cervix.  Shyla scissors was then used to circumferentially cut around the vagina just below the cervix, removing the cervix.  The edges of the vagina were grasped with Kocher clamps.  The vagina was closed with interrupted sutures  of 0 Vicryl.      At this point, the abdomen was irrigated with normal saline.  All pedicles were inspected and found to be hemostatic.  Cautery was used around in a couple of places near the cuff to provide excellent hemostasis.  The bowel was then unpacked and the [place] retractor was removed.  The peritoneum was closed with 3-0 Vicryl in a running fashion.  The rectus muscles were inspected and hemostasis assured with cautery.  The fascia was closed with 0 Vicryl in a running fashion.  Subcutaneous tissues were irrigated and hemostasis assured with cautery.  The skin was closed with 4-0 Monocryl in a subcuticular fashion.  Steri-Strips and bandage were placed.      The patient tolerated the procedure well.  Sponge, lap, needle and instrument  counts were correct.  The patient was taken to the recovery room in stable condition.         RIANA HERNANDEZ MD             D: 2019   T: 2019   MT: ELROY      Name:     BLAS FRANKS   MRN:      [id number]        Account:        [id number]   :      1980           Procedure Date: 2019      Document: [id number]

## 2019-07-17 NOTE — PLAN OF CARE
VSS.  Placed on 2L O2 when sleeping d/t desatting to 87%.  Reports pain 3/10 when laying still and 5-6/10 when moving, used 6mg dilaudid PCA, 30mg Iv toradol given x1, and scheduled tylenol given x1.  Dressing to abd incision c/d/I.  Small amt of vag bleeding on boni pad.  Hypoactive bowel sounds, denies passing flatus.  Tolerating clear liquids.  2325mL clear pale urine emptied from bright.  Dangled and stood at bedside.  Instructed on IS and coughing/deep breathing.  Hgb to be checked later this am.

## 2019-07-17 NOTE — PLAN OF CARE
Cared for pt [id number], A&O x 4, assist x 2, pt didn't get out of bed during shift, clear liquid diet, did eat 2 jello's during shift, PCA pump, IV fluids 125 mL/hr, no BS, not passing gas, bright in place, sticky note for doctor regarding code status, will continue to monitor.

## 2019-07-17 NOTE — PROGRESS NOTES
"Gyn Post-operative Note POD# 1    S:  Patient doing well.  Pain well controlled on pain medication.    Tolerating clear diet.  Brar removed this AM.  No flatus or BM yet.  No N/V.  Tolerating clears.    O:   /68 (BP Location: Right arm)   Pulse 91   Temp 97.7  F (36.5  C) (Oral)   Resp 16   Ht 1.651 m (5' 5\")   Wt 82.5 kg (181 lb 14.4 oz)   LMP 07/03/2019   SpO2 99%   BMI 30.27 kg/m     I/O - since MN: 1515/2325, 24 hours: 4840/3700 (3050 in urine)  Gen- A&O, NAD  Abd- soft, non-tender, non-distended, no guarding or rebound  Incision(s)- dressing C/D/I  Ext- Non-tender, no edema    Labs:    Hemoglobin   Date Value Ref Range Status   07/17/2019 10.2 (L) 11.7 - 15.7 g/dL Final   ]     Pathology pending    A/P:  38 year old POD# 1 s/p LAURA/bilateral salpingectomy.    1.  Routine post-op cares  2.  Advance cares per routine  3.  Ambulate  4.  Analgesia - Will discontinue toradol and start ibuprofen today.  Discontinue PCA today also when tolerating regular diet and oral pain meds.  5.  The plan of care was discussed with the patient.  She expressed understanding and agreement.  6.  Discharge likely POD 2 or 3  7. Acute blood loss anemia - asymptomatic.        Rosalind Park  7/17/2019  8:12 AM   "

## 2019-07-18 ENCOUNTER — DOCUMENTATION ONLY (OUTPATIENT)
Dept: OTHER | Facility: CLINIC | Age: 39
End: 2019-07-18

## 2019-07-18 LAB — GLUCOSE BLDC GLUCOMTR-MCNC: 82 MG/DL (ref 70–99)

## 2019-07-18 PROCEDURE — 25800025 ZZH RX 258: Performed by: OBSTETRICS & GYNECOLOGY

## 2019-07-18 PROCEDURE — 00000146 ZZHCL STATISTIC GLUCOSE BY METER IP

## 2019-07-18 PROCEDURE — 25000132 ZZH RX MED GY IP 250 OP 250 PS 637: Performed by: OBSTETRICS & GYNECOLOGY

## 2019-07-18 PROCEDURE — 12000000 ZZH R&B MED SURG/OB

## 2019-07-18 RX ORDER — IBUPROFEN 800 MG/1
800 TABLET, FILM COATED ORAL EVERY 8 HOURS PRN
COMMUNITY
Start: 2019-07-18

## 2019-07-18 RX ORDER — ACETAMINOPHEN 325 MG/1
650 TABLET ORAL EVERY 6 HOURS PRN
COMMUNITY
Start: 2019-07-18

## 2019-07-18 RX ORDER — OXYCODONE HYDROCHLORIDE 5 MG/1
5-10 TABLET ORAL EVERY 4 HOURS PRN
Qty: 30 TABLET | Refills: 0 | Status: SHIPPED | OUTPATIENT
Start: 2019-07-18

## 2019-07-18 RX ORDER — AMOXICILLIN 250 MG
1 CAPSULE ORAL 2 TIMES DAILY PRN
Qty: 60 TABLET | Refills: 0 | Status: SHIPPED | OUTPATIENT
Start: 2019-07-18

## 2019-07-18 RX ADMIN — OXYCODONE HYDROCHLORIDE 5 MG: 5 TABLET ORAL at 08:17

## 2019-07-18 RX ADMIN — DEXTROSE AND SODIUM CHLORIDE: 5; 450 INJECTION, SOLUTION INTRAVENOUS at 02:10

## 2019-07-18 RX ADMIN — ACETAMINOPHEN 975 MG: 325 TABLET, FILM COATED ORAL at 02:08

## 2019-07-18 RX ADMIN — OXYCODONE HYDROCHLORIDE 5 MG: 5 TABLET ORAL at 16:24

## 2019-07-18 RX ADMIN — ACETAMINOPHEN 975 MG: 325 TABLET, FILM COATED ORAL at 18:02

## 2019-07-18 RX ADMIN — OXYCODONE HYDROCHLORIDE 5 MG: 5 TABLET ORAL at 20:58

## 2019-07-18 RX ADMIN — METOCLOPRAMIDE HYDROCHLORIDE 10 MG: 5 TABLET ORAL at 12:46

## 2019-07-18 RX ADMIN — DEXTROSE AND SODIUM CHLORIDE: 5; 450 INJECTION, SOLUTION INTRAVENOUS at 10:14

## 2019-07-18 RX ADMIN — OXYCODONE HYDROCHLORIDE 5 MG: 5 TABLET ORAL at 06:51

## 2019-07-18 RX ADMIN — ACETAMINOPHEN 975 MG: 325 TABLET, FILM COATED ORAL at 10:16

## 2019-07-18 RX ADMIN — OXYCODONE HYDROCHLORIDE 10 MG: 5 TABLET ORAL at 02:08

## 2019-07-18 RX ADMIN — OXYCODONE HYDROCHLORIDE 10 MG: 5 TABLET ORAL at 10:16

## 2019-07-18 RX ADMIN — SENNOSIDES AND DOCUSATE SODIUM 2 TABLET: 8.6; 5 TABLET ORAL at 08:17

## 2019-07-18 RX ADMIN — IBUPROFEN 800 MG: 800 TABLET ORAL at 15:03

## 2019-07-18 RX ADMIN — IBUPROFEN 800 MG: 800 TABLET ORAL at 20:58

## 2019-07-18 RX ADMIN — IBUPROFEN 800 MG: 800 TABLET ORAL at 08:16

## 2019-07-18 ASSESSMENT — ACTIVITIES OF DAILY LIVING (ADL)
ADLS_ACUITY_SCORE: 13

## 2019-07-18 ASSESSMENT — MIFFLIN-ST. JEOR: SCORE: 1531.35

## 2019-07-18 NOTE — PLAN OF CARE
Patient needing lots of attention, many questions between her and her mother, reviewed pain management plan and discussed goals for discharge several times with patient, plan is to take 1 tab of oxycodone because she was nauseated after trying 2 tabs and said she felt stoned, received reglan po which was effective for nausea, appetite fair on regular diet, up independently now, encouraged use of ibuprofen and ice and to use narcotics when pain not tolerable, incision covered and c/d/I, continue to encourage IS and walking, no BM for 3-4 days, received senna

## 2019-07-18 NOTE — PLAN OF CARE
Pt A&O, up in halls SBA to independent.  VSS, afebrile & sats maintained on RA.  C/o moderate pain initially, improved with frequent PCA use, Tylenol, ibuprofen and heat. Transitioned to oxycodone this shift and PCA discontinued.  Abd dressing CDI. Small amount of bright bloody vaginal bleeding, no clots.  Tolerating regular diet, -n/v. C/o gas discomfort.  Voiding adequately, reports improvement in urinating completely.  Discharge home, TBD - ?tomorrow.

## 2019-07-18 NOTE — PLAN OF CARE
Ambulatory Status:  Pt up ind  VSS  Pain: C/O pain,controlled with Oxycodone and scheduled Tylenol  Small amt of pink spotting  Resp: LS clear  GI: denies nausea. Reg diet.  BS hypoactive passing flatus.  Last BM prior to surgery  : voiding adequately   Skin: abd drsg C/D/I  Consults: OBGYN

## 2019-07-18 NOTE — PROGRESS NOTES
"Gyn Post-operative Note POD# 2    S:  Patient doing well.  Pain has been 3/10 and well controlled with oral pain meds. Passing some flatus, voiding w/o difficulty. Ambulated overnight. Tolerating regular diet w/o N/V. Using IC.     O:   /70 (BP Location: Right arm)   Pulse 86   Temp 97.1  F (36.2  C) (Oral)   Resp 14   Ht 1.651 m (5' 5\")   Wt 86 kg (189 lb 11.2 oz)   LMP 07/03/2019   SpO2 98%   BMI 31.57 kg/m       Gen- A&O, NAD  Abd- soft, non-tender, non-distended, no guarding or rebound  Incision(s)- bandage removed, C/D/I, steristrips in place  Ext- Non-tender, no edema    Labs:    Hemoglobin   Date Value Ref Range Status   07/17/2019 10.2 (L) 11.7 - 15.7 g/dL Final   ]     Pathology pending    A/P:  38 year old POD# 2 s/p LAURA/bilateral salpingectomy.    1.  Routine post-op cares  2.  Advance cares per routine  3.  Ambulate  4.  Analgesia - doing well on oral meds  5.  The plan of care was discussed with the patient.  She expressed understanding and agreement.  6.  Discharge home later today if pt feels well. Rx written for oxycodone and stool softener. Pt has OTC tylenol & ibuprofen. F/u in the office in 2 weeks.     Velia Caldera  1:49 PM  "

## 2019-07-19 VITALS
TEMPERATURE: 97.4 F | HEART RATE: 86 BPM | OXYGEN SATURATION: 99 % | DIASTOLIC BLOOD PRESSURE: 75 MMHG | SYSTOLIC BLOOD PRESSURE: 135 MMHG | RESPIRATION RATE: 16 BRPM | HEIGHT: 65 IN | WEIGHT: 189.7 LBS | BODY MASS INDEX: 31.61 KG/M2

## 2019-07-19 LAB — COPATH REPORT: NORMAL

## 2019-07-19 PROCEDURE — 25000132 ZZH RX MED GY IP 250 OP 250 PS 637: Performed by: OBSTETRICS & GYNECOLOGY

## 2019-07-19 RX ADMIN — OXYCODONE HYDROCHLORIDE 5 MG: 5 TABLET ORAL at 04:03

## 2019-07-19 RX ADMIN — IBUPROFEN 800 MG: 800 TABLET ORAL at 02:53

## 2019-07-19 RX ADMIN — ACETAMINOPHEN 975 MG: 325 TABLET, FILM COATED ORAL at 10:23

## 2019-07-19 RX ADMIN — IBUPROFEN 800 MG: 800 TABLET ORAL at 09:07

## 2019-07-19 RX ADMIN — OXYCODONE HYDROCHLORIDE 5 MG: 5 TABLET ORAL at 07:53

## 2019-07-19 RX ADMIN — SENNOSIDES AND DOCUSATE SODIUM 1 TABLET: 8.6; 5 TABLET ORAL at 07:52

## 2019-07-19 RX ADMIN — ACETAMINOPHEN 975 MG: 325 TABLET, FILM COATED ORAL at 01:35

## 2019-07-19 ASSESSMENT — ACTIVITIES OF DAILY LIVING (ADL)
ADLS_ACUITY_SCORE: 13

## 2019-07-19 NOTE — PROGRESS NOTES
"Gyn Post-operative Note POD# 3    S:  Patient doing well.  Pain well controlled with 5mg oxycodone and tylenol/ibuprofen. Passing some flatus, voiding w/o difficulty. Ambulating normally. Tolerating regular diet w/o N/V.     O:   /75 (BP Location: Left arm)   Pulse 86   Temp 97.4  F (36.3  C) (Oral)   Resp 16   Ht 1.651 m (5' 5\")   Wt 86 kg (189 lb 11.2 oz)   LMP 07/03/2019   SpO2 99%   BMI 31.57 kg/m       Gen- A&O, NAD  Abd- soft, non-tender, non-distended, no guarding or rebound  Incision(s)-C/D/I, steristrips in place  Ext- Non-tender, no edema    Labs:    Hemoglobin   Date Value Ref Range Status   07/17/2019 10.2 (L) 11.7 - 15.7 g/dL Final   ]     Pathology pending    A/P:  38 year old POD# 3 s/p LAURA/bilateral salpingectomy.    1.  Routine post-op cares  2.  Advance cares per routine  3.  Ambulate  4.  Analgesia - doing well on oral meds  5.  The plan of care was discussed with the patient.  She expressed understanding and agreement.  6.  Discharge home this morning, f/u in 2 weeks    Velia Caldera  07/19/19  10:29 AM    "

## 2019-07-19 NOTE — PLAN OF CARE
"Ambulatory Status:  Pt up ind  VSS  Pain: c/o abd pain 3-4/10,given Tylenol,Ibuprofen,Oxycodone, and ice. Patient states \"Oxycodone make me feel funny\" Patient could not describe the feeling but still wanted to take it.  Resp: LS clear GI: denies nausea. Tolerating a reg diet.  BS faint   Passing flatus.  Last BM 7/18  : voiding adequately, minimal pink spotting  Skin: abd incision steri strips intact, bruising noted around incision  Disposition: discharging home today  "

## 2019-07-19 NOTE — PLAN OF CARE
Pt up walking in roth multiple times independently. She has three bowel movements tonight. Pain in abdomen, given Ibuprofen, Tylenol and Oxycodone for comfort. One brief episode of nausea that resolves without intervention. IV saline locked. Good oral intake. Pt voiding adequately.

## 2019-08-13 NOTE — DISCHARGE SUMMARY
Admit Date:     2019   Discharge Date:     2019      ADMISSION DIAGNOSES:   1.  Large fibroid uterus.   2.  Pelvic pain.      DISCHARGE DIAGNOSES:     1.  Large fibroid uterus.   2.  Pelvic pain.      PROCEDURE PERFORMED:  Total abdominal hysterectomy, bilateral salpingectomy, left ureterolysis, lysis of adhesions.      HISTORY:  This patient is a 38-year-old nulligravida female with known uterine fibroids.  Her uterus measuring 15.7 x12.5 x 7.5 cm with multiple large fibroids present.  The patient has been having issues with pelvic pain due to her large fibroid uterus and is ready for definitive management.  Please see admission history and physical for details.      PROCEDURE:  The patient was admitted on the day of surgery and underwent total abdominal hysterectomy, bilateral salpingectomy, left ureterolysis, lysis of adhesions with estimated blood loss of 650 mL.  Please see the operative report for details.  There were no complications.      POSTOPERATIVE COURSE:  On postoperative day #1, the patient's hemoglobin was 10.2.  She followed a routine postoperative course.  She was initially on a PCA, which was transferred to oral medications.  Her diet was advanced as tolerated and she was tolerating a regular diet.  She was ready for discharge to home on postoperative day #3, which was 2019.  She was given prescriptions for oxycodone and stool softener.  She will use over-the-counter Tylenol and ibuprofen as well for pain control.  She will follow up in the office in 2 weeks for postoperative checkup.         RIANA HERNANDEZ MD             D: 2019   T: 2019   MT: [id number]      Name:     BLAS FRANKS   MRN:      [id number]        Account:        [id number]   :      1980           Admit Date:     2019                                  Discharge Date: 2019      Document: [id number]

## 2020-12-21 ENCOUNTER — HOSPITAL ENCOUNTER (OUTPATIENT)
Dept: MAMMOGRAPHY | Facility: CLINIC | Age: 40
End: 2020-12-21
Attending: OBSTETRICS & GYNECOLOGY
Payer: COMMERCIAL

## 2020-12-21 DIAGNOSIS — N64.52 BILATERAL NIPPLE DISCHARGE: ICD-10-CM

## 2020-12-21 PROCEDURE — G0279 TOMOSYNTHESIS, MAMMO: HCPCS

## 2021-01-09 ENCOUNTER — HEALTH MAINTENANCE LETTER (OUTPATIENT)
Age: 41
End: 2021-01-09

## 2021-10-23 ENCOUNTER — HEALTH MAINTENANCE LETTER (OUTPATIENT)
Age: 41
End: 2021-10-23

## 2022-02-12 ENCOUNTER — HEALTH MAINTENANCE LETTER (OUTPATIENT)
Age: 42
End: 2022-02-12

## 2022-03-29 ENCOUNTER — HOSPITAL ENCOUNTER (OUTPATIENT)
Dept: MAMMOGRAPHY | Facility: CLINIC | Age: 42
Discharge: HOME | End: 2022-03-29
Attending: OBSTETRICS & GYNECOLOGY | Admitting: OBSTETRICS & GYNECOLOGY
Payer: COMMERCIAL

## 2022-03-29 DIAGNOSIS — Z12.31 VISIT FOR SCREENING MAMMOGRAM: ICD-10-CM

## 2022-03-29 PROCEDURE — 77067 SCR MAMMO BI INCL CAD: CPT

## 2022-10-09 ENCOUNTER — HEALTH MAINTENANCE LETTER (OUTPATIENT)
Age: 42
End: 2022-10-09

## 2023-03-25 ENCOUNTER — HEALTH MAINTENANCE LETTER (OUTPATIENT)
Age: 43
End: 2023-03-25

## 2023-06-23 ENCOUNTER — HOSPITAL ENCOUNTER (OUTPATIENT)
Dept: MAMMOGRAPHY | Facility: CLINIC | Age: 43
Discharge: HOME | End: 2023-06-23
Attending: FAMILY MEDICINE | Admitting: FAMILY MEDICINE
Payer: COMMERCIAL

## 2023-06-23 DIAGNOSIS — Z12.31 VISIT FOR SCREENING MAMMOGRAM: ICD-10-CM

## 2023-06-23 PROCEDURE — 77067 SCR MAMMO BI INCL CAD: CPT

## 2023-07-12 ENCOUNTER — HOSPITAL ENCOUNTER (OUTPATIENT)
Dept: MAMMOGRAPHY | Facility: CLINIC | Age: 43
Discharge: HOME | End: 2023-07-12
Attending: INTERNAL MEDICINE
Payer: COMMERCIAL

## 2023-07-12 DIAGNOSIS — R92.8 ABNORMAL MAMMOGRAM: ICD-10-CM

## 2023-07-12 PROCEDURE — 77061 BREAST TOMOSYNTHESIS UNI: CPT | Mod: LT

## 2023-09-20 ENCOUNTER — LAB REQUISITION (OUTPATIENT)
Dept: LAB | Facility: CLINIC | Age: 43
End: 2023-09-20

## 2023-09-20 DIAGNOSIS — Z01.419 ENCOUNTER FOR GYNECOLOGICAL EXAMINATION (GENERAL) (ROUTINE) WITHOUT ABNORMAL FINDINGS: ICD-10-CM

## 2023-09-20 PROCEDURE — 87624 HPV HI-RISK TYP POOLED RSLT: CPT | Performed by: OBSTETRICS & GYNECOLOGY

## 2023-09-20 PROCEDURE — G0145 SCR C/V CYTO,THINLAYER,RESCR: HCPCS | Performed by: OBSTETRICS & GYNECOLOGY

## 2023-09-22 LAB
BKR LAB AP GYN ADEQUACY: NORMAL
BKR LAB AP GYN INTERPRETATION: NORMAL
BKR LAB AP HPV REFLEX: NORMAL
BKR LAB AP LMP: NORMAL
BKR LAB AP PREVIOUS ABNL DX: NORMAL
BKR LAB AP PREVIOUS ABNORMAL: NORMAL
PATH REPORT.COMMENTS IMP SPEC: NORMAL
PATH REPORT.COMMENTS IMP SPEC: NORMAL
PATH REPORT.RELEVANT HX SPEC: NORMAL

## 2023-09-25 LAB
HUMAN PAPILLOMA VIRUS 16 DNA: NEGATIVE
HUMAN PAPILLOMA VIRUS 18 DNA: NEGATIVE
HUMAN PAPILLOMA VIRUS FINAL DIAGNOSIS: NORMAL
HUMAN PAPILLOMA VIRUS OTHER HR: NEGATIVE

## 2024-10-12 ENCOUNTER — HEALTH MAINTENANCE LETTER (OUTPATIENT)
Age: 44
End: 2024-10-12

## (undated) DEVICE — LINEN HALF SHEET 5512

## (undated) DEVICE — SUCTION CANISTER MEDIVAC LINER 3000ML W/LID 65651-530

## (undated) DEVICE — SPONGE LAP 18X18" X8435

## (undated) DEVICE — PEN MARKING SKIN

## (undated) DEVICE — LINEN FULL SHEET 5511

## (undated) DEVICE — DRSG GAUZE 4X4" 8044

## (undated) DEVICE — DRSG STERI STRIP 1/2X4" R1547

## (undated) DEVICE — SU VICRYL 3-0 SH 27" J316H

## (undated) DEVICE — PROTECTOR ARM ONE-STEP TRENDELENBURG 40418

## (undated) DEVICE — CATH TRAY FOLEY 16FR SIL

## (undated) DEVICE — SUCTION TIP POOLE K770

## (undated) DEVICE — SU MONOCRYL 4-0 PS-2 27" UND Y426H

## (undated) DEVICE — GLOVE PROTEXIS W/NEU-THERA 6.5  2D73TE65

## (undated) DEVICE — PAD CHUX UNDERPAD 30X36" P3036C

## (undated) DEVICE — ESU GROUND PAD ADULT W/CORD E7507

## (undated) DEVICE — SU VICRYL 0 CT-1 36" J346H

## (undated) DEVICE — PREP POVIDONE IODINE SOLUTION 10% 4OZ

## (undated) DEVICE — PACK ABD HYST LATEX PB15TBFCR

## (undated) DEVICE — LINEN ORTHO ACL PACK 5447

## (undated) DEVICE — BAG CLEAR TRASH 1.3M 39X33" P4040C

## (undated) DEVICE — SU VICRYL 3-0 CT-1 36" J338H

## (undated) DEVICE — SYR 50ML LL W/O NDL 309653

## (undated) DEVICE — SOL NACL 0.9% IRRIG 1000ML BOTTLE 2F7124

## (undated) DEVICE — SU VICRYL 0 TIE 12X18" J906G

## (undated) DEVICE — PREP BALL KERLIX ROUND LATEX

## (undated) DEVICE — SU VICRYL 0 CT-1 CR 8X27" UND JJ41G

## (undated) DEVICE — GOWN XLG DISP 9545

## (undated) DEVICE — ESU ELEC BLADE 6" COATED E1450-6

## (undated) DEVICE — SPONGE BALL KERLIX ROUND XL W/O STRING LATEX 4935

## (undated) RX ORDER — PROMETHAZINE HYDROCHLORIDE 25 MG/ML
INJECTION, SOLUTION INTRAMUSCULAR; INTRAVENOUS
Status: DISPENSED
Start: 2019-07-16

## (undated) RX ORDER — DEXAMETHASONE SODIUM PHOSPHATE 4 MG/ML
INJECTION, SOLUTION INTRA-ARTICULAR; INTRALESIONAL; INTRAMUSCULAR; INTRAVENOUS; SOFT TISSUE
Status: DISPENSED
Start: 2019-07-16

## (undated) RX ORDER — GLYCOPYRROLATE 0.2 MG/ML
INJECTION INTRAMUSCULAR; INTRAVENOUS
Status: DISPENSED
Start: 2019-07-16

## (undated) RX ORDER — PROPOFOL 10 MG/ML
INJECTION, EMULSION INTRAVENOUS
Status: DISPENSED
Start: 2019-07-16

## (undated) RX ORDER — FENTANYL CITRATE 50 UG/ML
INJECTION, SOLUTION INTRAMUSCULAR; INTRAVENOUS
Status: DISPENSED
Start: 2019-07-16

## (undated) RX ORDER — CEFAZOLIN SODIUM 2 G/100ML
INJECTION, SOLUTION INTRAVENOUS
Status: DISPENSED
Start: 2019-07-16

## (undated) RX ORDER — EPHEDRINE SULFATE 50 MG/ML
INJECTION, SOLUTION INTRAMUSCULAR; INTRAVENOUS; SUBCUTANEOUS
Status: DISPENSED
Start: 2019-07-16

## (undated) RX ORDER — EPHEDRINE SULFATE 50 MG/ML
INJECTION, SOLUTION INTRAVENOUS
Status: DISPENSED
Start: 2019-07-16

## (undated) RX ORDER — LIDOCAINE HYDROCHLORIDE 10 MG/ML
INJECTION, SOLUTION EPIDURAL; INFILTRATION; INTRACAUDAL; PERINEURAL
Status: DISPENSED
Start: 2019-07-16

## (undated) RX ORDER — NEOSTIGMINE METHYLSULFATE 1 MG/ML
VIAL (ML) INJECTION
Status: DISPENSED
Start: 2019-07-16

## (undated) RX ORDER — CEFAZOLIN SODIUM 1 G/3ML
INJECTION, POWDER, FOR SOLUTION INTRAMUSCULAR; INTRAVENOUS
Status: DISPENSED
Start: 2019-07-16

## (undated) RX ORDER — ONDANSETRON 2 MG/ML
INJECTION INTRAMUSCULAR; INTRAVENOUS
Status: DISPENSED
Start: 2019-07-16

## (undated) RX ORDER — KETOROLAC TROMETHAMINE 30 MG/ML
INJECTION, SOLUTION INTRAMUSCULAR; INTRAVENOUS
Status: DISPENSED
Start: 2019-07-16